# Patient Record
Sex: MALE | NOT HISPANIC OR LATINO | Employment: FULL TIME | URBAN - METROPOLITAN AREA
[De-identification: names, ages, dates, MRNs, and addresses within clinical notes are randomized per-mention and may not be internally consistent; named-entity substitution may affect disease eponyms.]

---

## 2017-09-01 ENCOUNTER — HOSPITAL ENCOUNTER (EMERGENCY)
Facility: HOSPITAL | Age: 32
Discharge: HOME | End: 2017-09-01
Attending: EMERGENCY MEDICINE | Admitting: EMERGENCY MEDICINE

## 2017-09-01 ENCOUNTER — APPOINTMENT (OUTPATIENT)
Dept: CT IMAGING | Facility: HOSPITAL | Age: 32
End: 2017-09-01

## 2017-09-01 VITALS
HEIGHT: 70 IN | HEART RATE: 80 BPM | OXYGEN SATURATION: 98 % | TEMPERATURE: 98.8 F | SYSTOLIC BLOOD PRESSURE: 130 MMHG | BODY MASS INDEX: 20.04 KG/M2 | RESPIRATION RATE: 18 BRPM | DIASTOLIC BLOOD PRESSURE: 75 MMHG | WEIGHT: 140 LBS

## 2017-09-01 DIAGNOSIS — R56.9 NEW ONSET SEIZURE (HCC): Primary | ICD-10-CM

## 2017-09-01 LAB
ALBUMIN SERPL-MCNC: 4.5 G/DL (ref 3.5–5.2)
ALBUMIN/GLOB SERPL: 1.5 G/DL
ALP SERPL-CCNC: 63 U/L (ref 39–117)
ALT SERPL W P-5'-P-CCNC: 11 U/L (ref 1–41)
ANION GAP SERPL CALCULATED.3IONS-SCNC: 11.7 MMOL/L
AST SERPL-CCNC: 14 U/L (ref 1–40)
BASOPHILS # BLD AUTO: 0.03 10*3/MM3 (ref 0–0.2)
BASOPHILS NFR BLD AUTO: 0.5 % (ref 0–1.5)
BILIRUB SERPL-MCNC: 0.8 MG/DL (ref 0.1–1.2)
BUN BLD-MCNC: 9 MG/DL (ref 6–20)
BUN/CREAT SERPL: 9.7 (ref 7–25)
CALCIUM SPEC-SCNC: 9.3 MG/DL (ref 8.6–10.5)
CHLORIDE SERPL-SCNC: 104 MMOL/L (ref 98–107)
CO2 SERPL-SCNC: 28.3 MMOL/L (ref 22–29)
CREAT BLD-MCNC: 0.93 MG/DL (ref 0.76–1.27)
DEPRECATED RDW RBC AUTO: 38.6 FL (ref 37–54)
EOSINOPHIL # BLD AUTO: 0.15 10*3/MM3 (ref 0–0.7)
EOSINOPHIL NFR BLD AUTO: 2.4 % (ref 0.3–6.2)
ERYTHROCYTE [DISTWIDTH] IN BLOOD BY AUTOMATED COUNT: 12.8 % (ref 11.5–14.5)
GFR SERPL CREATININE-BSD FRML MDRD: 113 ML/MIN/1.73
GFR SERPL CREATININE-BSD FRML MDRD: 94 ML/MIN/1.73
GLOBULIN UR ELPH-MCNC: 3.1 GM/DL
GLUCOSE BLD-MCNC: 101 MG/DL (ref 65–99)
HCT VFR BLD AUTO: 42.6 % (ref 40.4–52.2)
HGB BLD-MCNC: 14 G/DL (ref 13.7–17.6)
HOLD SPECIMEN: NORMAL
HOLD SPECIMEN: NORMAL
IMM GRANULOCYTES # BLD: 0 10*3/MM3 (ref 0–0.03)
IMM GRANULOCYTES NFR BLD: 0 % (ref 0–0.5)
LYMPHOCYTES # BLD AUTO: 2.63 10*3/MM3 (ref 0.9–4.8)
LYMPHOCYTES NFR BLD AUTO: 42.8 % (ref 19.6–45.3)
MAGNESIUM SERPL-MCNC: 2.5 MG/DL (ref 1.6–2.6)
MCH RBC QN AUTO: 27.7 PG (ref 27–32.7)
MCHC RBC AUTO-ENTMCNC: 32.9 G/DL (ref 32.6–36.4)
MCV RBC AUTO: 84.4 FL (ref 79.8–96.2)
MONOCYTES # BLD AUTO: 0.44 10*3/MM3 (ref 0.2–1.2)
MONOCYTES NFR BLD AUTO: 7.2 % (ref 5–12)
NEUTROPHILS # BLD AUTO: 2.89 10*3/MM3 (ref 1.9–8.1)
NEUTROPHILS NFR BLD AUTO: 47.1 % (ref 42.7–76)
PLATELET # BLD AUTO: 186 10*3/MM3 (ref 140–500)
PMV BLD AUTO: 10.1 FL (ref 6–12)
POTASSIUM BLD-SCNC: 3.5 MMOL/L (ref 3.5–5.2)
PROT SERPL-MCNC: 7.6 G/DL (ref 6–8.5)
RBC # BLD AUTO: 5.05 10*6/MM3 (ref 4.6–6)
SODIUM BLD-SCNC: 144 MMOL/L (ref 136–145)
WBC NRBC COR # BLD: 6.14 10*3/MM3 (ref 4.5–10.7)
WHOLE BLOOD HOLD SPECIMEN: NORMAL
WHOLE BLOOD HOLD SPECIMEN: NORMAL

## 2017-09-01 PROCEDURE — 96365 THER/PROPH/DIAG IV INF INIT: CPT

## 2017-09-01 PROCEDURE — 70450 CT HEAD/BRAIN W/O DYE: CPT

## 2017-09-01 PROCEDURE — 85025 COMPLETE CBC W/AUTO DIFF WBC: CPT | Performed by: EMERGENCY MEDICINE

## 2017-09-01 PROCEDURE — 99284 EMERGENCY DEPT VISIT MOD MDM: CPT

## 2017-09-01 PROCEDURE — 80053 COMPREHEN METABOLIC PANEL: CPT | Performed by: EMERGENCY MEDICINE

## 2017-09-01 PROCEDURE — 93005 ELECTROCARDIOGRAM TRACING: CPT | Performed by: EMERGENCY MEDICINE

## 2017-09-01 PROCEDURE — 93010 ELECTROCARDIOGRAM REPORT: CPT | Performed by: INTERNAL MEDICINE

## 2017-09-01 PROCEDURE — 25010000003 LEVETIRACETAM IN NACL 0.75% 1000 MG/100ML SOLUTION: Performed by: EMERGENCY MEDICINE

## 2017-09-01 PROCEDURE — 83735 ASSAY OF MAGNESIUM: CPT | Performed by: EMERGENCY MEDICINE

## 2017-09-01 RX ORDER — LEVETIRACETAM 10 MG/ML
1000 INJECTION INTRAVASCULAR ONCE
Status: COMPLETED | OUTPATIENT
Start: 2017-09-01 | End: 2017-09-01

## 2017-09-01 RX ORDER — LEVETIRACETAM 500 MG/1
500 TABLET ORAL 2 TIMES DAILY
Qty: 60 TABLET | Refills: 0 | Status: SHIPPED | OUTPATIENT
Start: 2017-09-01

## 2017-09-01 RX ORDER — SODIUM CHLORIDE 0.9 % (FLUSH) 0.9 %
10 SYRINGE (ML) INJECTION AS NEEDED
Status: DISCONTINUED | OUTPATIENT
Start: 2017-09-01 | End: 2017-09-01 | Stop reason: HOSPADM

## 2017-09-01 RX ADMIN — LEVETIRACETAM 1000 MG: 1000 INJECTION, SOLUTION INTRAVENOUS at 02:05

## 2017-09-01 NOTE — ED NOTES
Pt's friends at bedside request pt call when determined if staying or being discharged  Edi - [phone]     Delfina Weaver RN  09/01/17 0049

## 2017-09-01 NOTE — ED PROVIDER NOTES
" EMERGENCY DEPARTMENT ENCOUNTER    CHIEF COMPLAINT  Chief Complaint: seizure  History given by: alicia jordan  History limited by: nothing  Room Number: 26/26  PMD: No Known Provider      HPI:  Pt is a 31 y.o. male who presents complaining of a seizure PTA. Per neighbors, the pt was acting normally, then went unconscious for 15 minutes. He woke up in the ambulance. He admits to right shoulder pain but denies headache, vision symptoms, or CP. Pt did bite tongue but did not have incontinence.    Duration:  PTA  Onset: sudden  Timing: episode  Location: N/A  Radiation: none  Quality: \"possible seizure\"  Intensity/Severity: moderate  Progression: resolved  Associated Symptoms: right shoulder pain, bitten tongue  Aggravating Factors: unknown  Alleviating Factors: unknown  Previous Episodes: none  Treatment before arrival: unknown    PAST MEDICAL HISTORY  Active Ambulatory Problems     Diagnosis Date Noted   • No Active Ambulatory Problems     Resolved Ambulatory Problems     Diagnosis Date Noted   • No Resolved Ambulatory Problems     No Additional Past Medical History       PAST SURGICAL HISTORY  No past surgical history on file.    FAMILY HISTORY  No family history on file.    SOCIAL HISTORY  Social History     Social History   • Marital status:      Spouse name: N/A   • Number of children: N/A   • Years of education: N/A     Occupational History   • Not on file.     Social History Main Topics   • Smoking status: Not on file   • Smokeless tobacco: Not on file   • Alcohol use Not on file   • Drug use: Not on file   • Sexual activity: Not on file     Other Topics Concern   • Not on file     Social History Narrative       ALLERGIES  Review of patient's allergies indicates no known allergies.    REVIEW OF SYSTEMS  Review of Systems   Constitutional: Negative for activity change, appetite change and fever.        Bitten tongue   HENT: Negative for congestion and sore throat.    Eyes: Negative.    Respiratory: " Negative for cough and shortness of breath.    Cardiovascular: Negative for chest pain and leg swelling.   Gastrointestinal: Negative for abdominal pain, diarrhea and vomiting.   Endocrine: Negative.    Genitourinary: Negative for decreased urine volume and dysuria.   Musculoskeletal: Negative for neck pain.        Right shoulder pain   Skin: Negative for rash and wound.   Allergic/Immunologic: Negative.    Neurological: Positive for seizures. Negative for weakness, numbness and headaches.   Hematological: Negative.    Psychiatric/Behavioral: Negative.    All other systems reviewed and are negative.      PHYSICAL EXAM  ED Triage Vitals   Temp Heart Rate Resp BP SpO2   09/01/17 0022 09/01/17 0020 09/01/17 0020 09/01/17 0020 09/01/17 0020   98.8 °F (37.1 °C) 144 16 146/91 97 %      Temp src Heart Rate Source Patient Position BP Location FiO2 (%)   -- -- -- -- --              Physical Exam   Constitutional: He is oriented to person, place, and time and well-developed, well-nourished, and in no distress.   HENT:   Head: Normocephalic and atraumatic.   Bite on right lateral aspect of the tongue   Eyes: EOM are normal. Pupils are equal, round, and reactive to light.   Neck: Normal range of motion. Neck supple.   Cardiovascular: Normal rate, regular rhythm and normal heart sounds.    Pulmonary/Chest: Effort normal and breath sounds normal. No respiratory distress.   Abdominal: Soft. There is no tenderness. There is no rebound and no guarding.   Musculoskeletal: Normal range of motion. He exhibits no edema.   FROM of right shoulder without pain or deformity.  NVM intact distally.      Neurological: He is alert and oriented to person, place, and time. He has normal sensation and normal strength. No cranial nerve deficit. Gait normal. Coordination normal. GCS score is 15.   Skin: Skin is warm and dry.   Psychiatric: Mood and affect normal.   Nursing note and vitals reviewed.      LAB RESULTS  Lab Results (last 24 hours)      Procedure Component Value Units Date/Time    CBC & Differential [[id number]] Collected:  09/01/17 0044    Specimen:  Blood Updated:  09/01/17 0100    Narrative:       The following orders were created for panel order CBC & Differential.  Procedure                               Abnormality         Status                     ---------                               -----------         ------                     CBC Auto Differential[[id number]]        Normal              Final result                 Please view results for these tests on the individual orders.    Comprehensive Metabolic Panel [[id number]]  (Abnormal) Collected:  09/01/17 0044    Specimen:  Blood Updated:  09/01/17 0124     Glucose 101 (H) mg/dL      BUN 9 mg/dL      Creatinine 0.93 mg/dL      Sodium 144 mmol/L      Potassium 3.5 mmol/L      Chloride 104 mmol/L      CO2 28.3 mmol/L      Calcium 9.3 mg/dL      Total Protein 7.6 g/dL      Albumin 4.50 g/dL      ALT (SGPT) 11 U/L      AST (SGOT) 14 U/L      Alkaline Phosphatase 63 U/L      Total Bilirubin 0.8 mg/dL      eGFR Non African Amer 94 mL/min/1.73      eGFR  African Amer 113 mL/min/1.73      Globulin 3.1 gm/dL      A/G Ratio 1.5 g/dL      BUN/Creatinine Ratio 9.7     Anion Gap 11.7 mmol/L     Magnesium [[id number]]  (Normal) Collected:  09/01/17 0044    Specimen:  Blood Updated:  09/01/17 0124     Magnesium 2.5 mg/dL     CBC Auto Differential [[id number]]  (Normal) Collected:  09/01/17 0044    Specimen:  Blood Updated:  09/01/17 0100     WBC 6.14 10*3/mm3      RBC 5.05 10*6/mm3      Hemoglobin 14.0 g/dL      Hematocrit 42.6 %      MCV 84.4 fL      MCH 27.7 pg      MCHC 32.9 g/dL      RDW 12.8 %      RDW-SD 38.6 fl      MPV 10.1 fL      Platelets 186 10*3/mm3      Neutrophil % 47.1 %      Lymphocyte % 42.8 %      Monocyte % 7.2 %      Eosinophil % 2.4 %      Basophil % 0.5 %      Immature Grans % 0.0 %      Neutrophils, Absolute 2.89 10*3/mm3      Lymphocytes, Absolute 2.63 10*3/mm3      Monocytes,  Absolute 0.44 10*3/mm3      Eosinophils, Absolute 0.15 10*3/mm3      Basophils, Absolute 0.03 10*3/mm3      Immature Grans, Absolute 0.00 10*3/mm3           I ordered the above labs and reviewed the results    RADIOLOGY  CT Head Without Contrast   Final Result   1. No acute intracranial abnormality.                           This study was performed with techniques to keep radiation doses as low   as reasonably achievable (ALARA). Individualized dose reduction   techniques using automated exposure control or adjustment of mA and/or   kV according to the patient size were employed.        This report was finalized on 9/1/2017 1:03 AM by Brennon Abarca MD.               I ordered the above noted radiological studies. Interpreted by radiologist. Reviewed by me in PACS.       PROCEDURES  Procedures  EKG           EKG time: 0120  Rhythm/Rate: NSR 80  P waves and IN: normal  QRS, axis: normal   ST and T waves: normal     Interpreted Contemporaneously by me, independently viewed  No priors for comparison      PROGRESS AND CONSULTS  ED Course     0032 - Ordered labs and CT Head for further evaluation.    0111 - Ordered EKG for further evaluation.    0133 - Pt rechecked. Pt is resting comfortably. Notified pt of unremarkable labs and negative imaging. Notified pt of state law disallowing driving for 90 days. Recommended admission for further testing, pt requests to be discharged. Discussed plan to discharge the pt on seizure medication and for pt to follow up with a Neurologist. Pt understands and agrees with plan, all questions addressed.    0137 - Ordered Keppra for seizure.    MEDICAL DECISION MAKING  Results were reviewed/discussed with the patient and they were also made aware of online access. Pt also made aware that some labs, such as cultures, will not be resulted during ER visit and follow up with PMD is necessary.     MDM  Number of Diagnoses or Management Options     Amount and/or Complexity of Data  Reviewed  Clinical lab tests: reviewed and ordered (unremarkable)  Tests in the radiology section of CPT®: reviewed and ordered (CT Head- nothing acute)  Tests in the medicine section of CPT®: reviewed and ordered (See EKG procedure note)           DIAGNOSIS  Final diagnoses:   New onset seizure       DISPOSITION  DISCHARGE    Patient discharged in stable condition.    Reviewed implications of results, diagnosis, meds, responsibility to follow up, warning signs and symptoms of possible worsening, potential complications and reasons to return to ER.    Patient/Family voiced understanding of above instructions.    Discussed plan for discharge, as there is no emergent indication for admission.  Pt/family is agreeable and understands need for follow up and repeat testing.  Pt is aware that discharge does not mean that nothing is wrong but it indicates no emergency is present that requires admission and they must continue care with follow-up as given below or physician of their choice.     FOLLOW-UP  [facility]YS[facility] REFERRAL SERVICE  [facility] [id number] [phone]  Schedule an appointment as soon as possible for a visit  for continued medical care    [facility]  [address] [facility] [address]  [facility] [address] [phone]  Schedule an appointment as soon as possible for a visit  new onset seizure    Dajuan Alt, DO  [address]  [facility] [address]  [phone]    Schedule an appointment as soon as possible for a visit  seizure         Medication List      New Prescriptions          levETIRAcetam 500 MG tablet   Commonly known as:  KEPPRA   Take 1 tablet by mouth 2 (Two) Times a Day.               Latest Documented Vital Signs:  As of 1:57 AM  BP- (!) 121/105 HR- 96 Temp- 98.8 °F (37.1 °C) O2 sat- 100%    --  Documentation assistance provided by erica Girard for Dr. Mcclain.  Information recorded by the scribcheli was done at my direction and  has been verified and validated by me.     Davey Girard  09/01/17 0157       Noe Mcclain MD  09/01/17 0201

## 2017-11-16 ENCOUNTER — HOSPITAL ENCOUNTER (EMERGENCY)
Facility: HOSPITAL | Age: 32
Discharge: HOME | End: 2017-11-16
Attending: EMERGENCY MEDICINE | Admitting: EMERGENCY MEDICINE

## 2017-11-16 VITALS
OXYGEN SATURATION: 99 % | TEMPERATURE: 98.7 F | WEIGHT: 143 LBS | DIASTOLIC BLOOD PRESSURE: 86 MMHG | HEART RATE: 90 BPM | SYSTOLIC BLOOD PRESSURE: 121 MMHG | BODY MASS INDEX: 20.47 KG/M2 | RESPIRATION RATE: 16 BRPM | HEIGHT: 70 IN

## 2017-11-16 DIAGNOSIS — G40.919 BREAKTHROUGH SEIZURE (HCC): Primary | ICD-10-CM

## 2017-11-16 LAB
ALBUMIN SERPL-MCNC: 5.1 G/DL (ref 3.5–5.2)
ALBUMIN/GLOB SERPL: 1.5 G/DL
ALP SERPL-CCNC: 73 U/L (ref 39–117)
ALT SERPL W P-5'-P-CCNC: 17 U/L (ref 1–41)
ANION GAP SERPL CALCULATED.3IONS-SCNC: 13.6 MMOL/L
AST SERPL-CCNC: 23 U/L (ref 1–40)
BILIRUB SERPL-MCNC: 0.6 MG/DL (ref 0.1–1.2)
BUN BLD-MCNC: 9 MG/DL (ref 6–20)
BUN/CREAT SERPL: 8.7 (ref 7–25)
CALCIUM SPEC-SCNC: 10.1 MG/DL (ref 8.6–10.5)
CHLORIDE SERPL-SCNC: 102 MMOL/L (ref 98–107)
CO2 SERPL-SCNC: 25.4 MMOL/L (ref 22–29)
CREAT BLD-MCNC: 1.03 MG/DL (ref 0.76–1.27)
GFR SERPL CREATININE-BSD FRML MDRD: 100 ML/MIN/1.73
GFR SERPL CREATININE-BSD FRML MDRD: 83 ML/MIN/1.73
GLOBULIN UR ELPH-MCNC: 3.4 GM/DL
GLUCOSE BLD-MCNC: 85 MG/DL (ref 65–99)
HOLD SPECIMEN: NORMAL
HOLD SPECIMEN: NORMAL
POTASSIUM BLD-SCNC: 4.2 MMOL/L (ref 3.5–5.2)
PROT SERPL-MCNC: 8.5 G/DL (ref 6–8.5)
SODIUM BLD-SCNC: 141 MMOL/L (ref 136–145)
WHOLE BLOOD HOLD SPECIMEN: NORMAL
WHOLE BLOOD HOLD SPECIMEN: NORMAL

## 2017-11-16 PROCEDURE — 80053 COMPREHEN METABOLIC PANEL: CPT | Performed by: EMERGENCY MEDICINE

## 2017-11-16 PROCEDURE — 36415 COLL VENOUS BLD VENIPUNCTURE: CPT

## 2017-11-16 PROCEDURE — 25010000002 LEVETIRACETAM IN NACL 0.82% 500 MG/100ML SOLUTION: Performed by: EMERGENCY MEDICINE

## 2017-11-16 PROCEDURE — 96365 THER/PROPH/DIAG IV INF INIT: CPT

## 2017-11-16 PROCEDURE — 99284 EMERGENCY DEPT VISIT MOD MDM: CPT

## 2017-11-16 RX ORDER — LEVETIRACETAM 5 MG/ML
500 INJECTION INTRAVASCULAR ONCE
Status: COMPLETED | OUTPATIENT
Start: 2017-11-16 | End: 2017-11-16

## 2017-11-16 RX ORDER — SODIUM CHLORIDE 0.9 % (FLUSH) 0.9 %
10 SYRINGE (ML) INJECTION AS NEEDED
Status: DISCONTINUED | OUTPATIENT
Start: 2017-11-16 | End: 2017-11-17 | Stop reason: HOSPADM

## 2017-11-16 RX ORDER — LEVETIRACETAM 500 MG/1
1000 TABLET ORAL 2 TIMES DAILY
COMMUNITY

## 2017-11-16 RX ADMIN — LEVETIRACETAM 500 MG: 500 INJECTION, SOLUTION INTRAVENOUS at 20:58

## 2017-11-17 NOTE — DISCHARGE INSTRUCTIONS
Continue home Keppra and follow up with Dr. Blakely.  Please return to the ED if symptoms worsen or if you have another seizure.  You cannot drive for 3 months unless told otherwise by Dr. Blakely.

## 2017-11-17 NOTE — ED PROVIDER NOTES
EMERGENCY DEPARTMENT ENCOUNTER    CHIEF COMPLAINT  Chief Complaint: seizure  History given by: patient  History limited by: nothing  Room Number: 13/13  PMD: No Known Provider  Neurologist- Dr. Blakely    HPI:  Pt is a 32 y.o. male with a history of seizures, who presents complaining of a seizure PTA. Pt states that he was doing pushups when he began to seize. Pt states that his spouse said that he was unconscious for several months. Pt states that he bit his tongue and complains of R shoulder pain and left foot pain. Pt denies urinary or fecal incontinence, any recent illness or not sleeping well last night. Pt is currently taking 1000mg Keppra BID and denies missing any recent doses of his Keppra.    Duration:  Several minutes  Onset: gradual  Timing: constant  Location: generalized  Radiation: N/A  Quality: unspecified  Intensity/Severity: moderate to severe  Progression: resolved  Associated Symptoms: injury to tongue, R shoulder, L foot pain  Aggravating Factors: none  Alleviating Factors: none  Previous Episodes: Pt states that he has a history of seizures.  Treatment before arrival: none    PAST MEDICAL HISTORY  Active Ambulatory Problems     Diagnosis Date Noted   • No Active Ambulatory Problems     Resolved Ambulatory Problems     Diagnosis Date Noted   • No Resolved Ambulatory Problems     Past Medical History:   Diagnosis Date   • Seizures        PAST SURGICAL HISTORY  History reviewed. No pertinent surgical history.    FAMILY HISTORY  History reviewed. No pertinent family history.    SOCIAL HISTORY  Social History     Social History   • Marital status:      Spouse name: N/A   • Number of children: N/A   • Years of education: N/A     Occupational History   • Not on file.     Social History Main Topics   • Smoking status: Never Smoker   • Smokeless tobacco: Not on file   • Alcohol use No      Comment: no alcohol x 3 mos   • Drug use: No   • Sexual activity: Not on file     Other Topics Concern   • Not  on file     Social History Narrative   • No narrative on file       ALLERGIES  Review of patient's allergies indicates no known allergies.    REVIEW OF SYSTEMS  Review of Systems   Constitutional: Negative for activity change, appetite change and fever.   HENT: Negative for congestion and sore throat.    Eyes: Negative.    Respiratory: Negative for cough and shortness of breath.    Cardiovascular: Negative for chest pain and leg swelling.   Gastrointestinal: Negative for abdominal pain, diarrhea and vomiting.   Endocrine: Negative.    Genitourinary: Negative for decreased urine volume and dysuria.   Musculoskeletal: Positive for arthralgias (R shoulder, L foot). Negative for neck pain.   Skin: Positive for wound (bit tongue). Negative for rash.   Allergic/Immunologic: Negative.    Neurological: Positive for seizures. Negative for weakness, numbness and headaches.   Hematological: Negative.    Psychiatric/Behavioral: Negative.    All other systems reviewed and are negative.      PHYSICAL EXAM  ED Triage Vitals   Temp Heart Rate Resp BP SpO2   11/16/17 2021 11/16/17 2021 11/16/17 2021 11/16/17 2021 11/16/17 2021   98.7 °F (37.1 °C) 110 12 130/73 98 %      Temp src Heart Rate Source Patient Position BP Location FiO2 (%)   11/16/17 2021 -- -- -- --   Tympanic           Physical Exam   Constitutional: He is oriented to person, place, and time. He appears distressed (mild).   HENT:   Head: Normocephalic. Head is with abrasion (on lateral right tongue).   Eyes: EOM are normal. Pupils are equal, round, and reactive to light.   Neck: Normal range of motion. Neck supple.   Cardiovascular: Normal rate, regular rhythm and normal heart sounds.    No murmur heard.  Pulmonary/Chest: Effort normal and breath sounds normal. No respiratory distress.   Abdominal: Soft. Bowel sounds are normal. He exhibits no distension. There is no tenderness. There is no rebound and no guarding.   Musculoskeletal: Normal range of motion. He exhibits  no edema.        Right shoulder: He exhibits normal range of motion and no tenderness.        Cervical back: He exhibits no tenderness.        Left foot: There is tenderness (minimal tenderness along the arch).   Lymphadenopathy:     He has no cervical adenopathy.   Neurological: He is alert and oriented to person, place, and time. He has normal sensation and normal strength.   Skin: Skin is warm and dry.   Psychiatric: Mood and affect normal.   Nursing note and vitals reviewed.      LAB RESULTS  Lab Results (last 24 hours)     Procedure Component Value Units Date/Time    Comprehensive Metabolic Panel [[id number]] Collected:  11/16/17 2036    Specimen:  Blood Updated:  11/16/17 2156     Glucose 85 mg/dL      BUN 9 mg/dL      Creatinine 1.03 mg/dL      Sodium 141 mmol/L      Potassium 4.2 mmol/L      Chloride 102 mmol/L      CO2 25.4 mmol/L      Calcium 10.1 mg/dL      Total Protein 8.5 g/dL      Albumin 5.10 g/dL      ALT (SGPT) 17 U/L      AST (SGOT) 23 U/L      Alkaline Phosphatase 73 U/L      Total Bilirubin 0.6 mg/dL      eGFR Non African Amer 83 mL/min/1.73      eGFR  African Amer 100 mL/min/1.73      Globulin 3.4 gm/dL      A/G Ratio 1.5 g/dL      BUN/Creatinine Ratio 8.7     Anion Gap 13.6 mmol/L           I ordered the above labs and reviewed the results    PROCEDURES  Procedures      PROGRESS AND CONSULTS  ED Course     2050- Pt declined L foot XR. Discussed the plan to order Keppra and to observe the pt for any additional seizure activity. Pt agrees with the plan and all questions were addressed.    2051- Ordered CMP for further evaluation and Keppra for seizure prevention.    2229- Pt has not had any seizure activity in the ED. Pt is eager to be discharged home. I will discharge the pt home.     10:30 PM  Latest vital signs   BP- 114/81 HR- 91 Temp- 98.7 °F (37.1 °C) (Tympanic) O2 sat- 100%    2233- Rechecked pt. Pt is resting comfortably and has had no seizure activity in the ED. Discussed the plan to  discharge the pt home with instructions to follow up with Dr. Blakely (neurology). I gave the pt seizure precautions and instructed him not to drive. Pt agrees with the plan and all questions were addressed.    MEDICAL DECISION MAKING  Results were reviewed/discussed with the patient and they were also made aware of online access. Pt also made aware that some labs, such as cultures, will not be resulted during ER visit and follow up with PMD is necessary.     MDM  Number of Diagnoses or Management Options     Amount and/or Complexity of Data Reviewed  Clinical lab tests: ordered and reviewed (CMP is unremarakble)  Decide to obtain previous medical records or to obtain history from someone other than the patient: yes  Review and summarize past medical records: yes (Pt was last seen in the ED on 9-1-17 for new onset seizure. Pt was started on Keppra and referred to neurology.)    Patient Progress  Patient progress: stable         DIAGNOSIS  Final diagnoses:   Breakthrough seizure       DISPOSITION  DISCHARGE    Patient discharged in stable condition.    Reviewed implications of results, diagnosis, meds, responsibility to follow up, warning signs and symptoms of possible worsening, potential complications and reasons to return to ER, including fever, worsening pain or any concerns.    Patient/Family voiced understanding of above instructions.    Discussed plan for discharge, as there is no emergent indication for admission.  Pt/family is agreeable and understands need for follow up and repeat testing.  Pt is aware that discharge does not mean that nothing is wrong but it indicates no emergency is present that requires admission and they must continue care with follow-up as given below or physician of their choice.     FOLLOW-UP  Jim Blakely MD  [address]  [facility] [address]  [facility] [address]  [phone]    Schedule an appointment as soon as possible for a visit           Medication List      Changed          levETIRAcetam  500 MG tablet   Commonly known as:  KEPPRA   What changed:  Another medication with the same name was removed. Continue   taking this medication, and follow the directions you see here.               Latest Documented Vital Signs:  As of 10:59 PM  BP- 116/88 HR- 91 Temp- 98.7 °F (37.1 °C) (Tympanic) O2 sat- 99%    --  Documentation assistance provided by erica Barrientos for Dr. Mayberry.  Information recorded by the ellaibcheli was done at my direction and has been verified and validated by me.     Reina Barrientos  11/16/17 0217       Isidro Mayberry MD  11/16/17 0549

## 2017-11-17 NOTE — ED NOTES
Pt to room 13 via ems stretcher with c/o of seizure that lasted approx 1 minute, prior to arrival.  Pt is alert and oriented at this time, did not lose control of bladder, but did bite his tongue.  Seizure was witnessed by his wife.  Pt was doing push ups at the time of his seizure, and he felt it coming on. Pt c/o slight pain to right shoulder when he turns his head to the right, but no other c/o at this time. Pt is PERRLA, respirations are even and unlabored, chest rise and fall is equal in expansion.  Pt does not appear to be in distress at this time.  Bed in low position, call light within reach, side rails up X2 and padded.  Suction in place at bedside.     Judy Bradley RN  11/16/17 2040

## 2017-11-17 NOTE — ED TRIAGE NOTES
Seizure -- clonic type seizure.  HX of same.  Last seizure was 1 month ago.  Saw neuologist and had meds increased. First seizure since increase in meds.  Pt awake and alert at this time.  Pt was working out at time of seizure.

## 2019-01-04 ENCOUNTER — HOSPITAL ENCOUNTER (EMERGENCY)
Facility: HOSPITAL | Age: 34
Discharge: HOME | End: 2019-01-04
Attending: EMERGENCY MEDICINE | Admitting: EMERGENCY MEDICINE

## 2019-01-04 VITALS
OXYGEN SATURATION: 100 % | RESPIRATION RATE: 18 BRPM | BODY MASS INDEX: 22.1 KG/M2 | HEIGHT: 72 IN | TEMPERATURE: 98.5 F | WEIGHT: 163.14 LBS | HEART RATE: 79 BPM | DIASTOLIC BLOOD PRESSURE: 87 MMHG | SYSTOLIC BLOOD PRESSURE: 130 MMHG

## 2019-01-04 DIAGNOSIS — R56.9 SEIZURE (HCC): Primary | ICD-10-CM

## 2019-01-04 LAB
ANION GAP SERPL CALCULATED.3IONS-SCNC: 8.9 MMOL/L
BASOPHILS # BLD AUTO: 0.03 10*3/MM3 (ref 0–0.2)
BASOPHILS NFR BLD AUTO: 0.5 % (ref 0–1.5)
BUN BLD-MCNC: 10 MG/DL (ref 6–20)
BUN/CREAT SERPL: 13.2 (ref 7–25)
CALCIUM SPEC-SCNC: 8.8 MG/DL (ref 8.6–10.5)
CHLORIDE SERPL-SCNC: 97 MMOL/L (ref 98–107)
CO2 SERPL-SCNC: 26.1 MMOL/L (ref 22–29)
CREAT BLD-MCNC: 0.76 MG/DL (ref 0.76–1.27)
DEPRECATED RDW RBC AUTO: 35.9 FL (ref 37–54)
EOSINOPHIL # BLD AUTO: 0.06 10*3/MM3 (ref 0–0.7)
EOSINOPHIL NFR BLD AUTO: 1 % (ref 0.3–6.2)
ERYTHROCYTE [DISTWIDTH] IN BLOOD BY AUTOMATED COUNT: 12.5 % (ref 11.5–14.5)
GFR SERPL CREATININE-BSD FRML MDRD: 117 ML/MIN/1.73
GFR SERPL CREATININE-BSD FRML MDRD: 141 ML/MIN/1.73
GLUCOSE BLD-MCNC: 101 MG/DL (ref 65–99)
HCT VFR BLD AUTO: 39.9 % (ref 40.4–52.2)
HGB BLD-MCNC: 13.8 G/DL (ref 13.7–17.6)
IMM GRANULOCYTES # BLD AUTO: 0.01 10*3/MM3 (ref 0–0.03)
IMM GRANULOCYTES NFR BLD AUTO: 0.2 % (ref 0–0.5)
LYMPHOCYTES # BLD AUTO: 1.52 10*3/MM3 (ref 0.9–4.8)
LYMPHOCYTES NFR BLD AUTO: 25.6 % (ref 19.6–45.3)
MCH RBC QN AUTO: 27.1 PG (ref 27–32.7)
MCHC RBC AUTO-ENTMCNC: 34.6 G/DL (ref 32.6–36.4)
MCV RBC AUTO: 78.2 FL (ref 79.8–96.2)
MONOCYTES # BLD AUTO: 0.48 10*3/MM3 (ref 0.2–1.2)
MONOCYTES NFR BLD AUTO: 8.1 % (ref 5–12)
NEUTROPHILS # BLD AUTO: 3.85 10*3/MM3 (ref 1.9–8.1)
NEUTROPHILS NFR BLD AUTO: 64.8 % (ref 42.7–76)
PLATELET # BLD AUTO: 204 10*3/MM3 (ref 140–500)
PMV BLD AUTO: 9.6 FL (ref 6–12)
POTASSIUM BLD-SCNC: 3.7 MMOL/L (ref 3.5–5.2)
RBC # BLD AUTO: 5.1 10*6/MM3 (ref 4.6–6)
SODIUM BLD-SCNC: 132 MMOL/L (ref 136–145)
WBC NRBC COR # BLD: 5.94 10*3/MM3 (ref 4.5–10.7)

## 2019-01-04 PROCEDURE — 80048 BASIC METABOLIC PNL TOTAL CA: CPT | Performed by: EMERGENCY MEDICINE

## 2019-01-04 PROCEDURE — 85025 COMPLETE CBC W/AUTO DIFF WBC: CPT | Performed by: EMERGENCY MEDICINE

## 2019-01-04 PROCEDURE — 99284 EMERGENCY DEPT VISIT MOD MDM: CPT

## 2019-01-04 RX ORDER — SODIUM CHLORIDE 0.9 % (FLUSH) 0.9 %
10 SYRINGE (ML) INJECTION AS NEEDED
Status: DISCONTINUED | OUTPATIENT
Start: 2019-01-04 | End: 2019-01-04 | Stop reason: HOSPADM

## 2019-01-04 NOTE — ED NOTES
Pt reports he was sitting at his desk when he had what coworkers called a seizure. Pt reports he did not fall out of his chair but was laying on his right shoulder on the desk. Pt has a small bite kristin to the right side of his tongue. Pt is alert and oriented x3 at this time and has no complaints.      Joyce Vasquez, KAVEH  01/04/19 2268

## 2019-01-04 NOTE — ED PROVIDER NOTES
EMERGENCY DEPARTMENT ENCOUNTER    Room Number:  23/23  Date seen:  1/4/2019  Time seen: 5:15 PM  PCP: Provider, No Known  Historian: patient      HPI:  Chief Complaint: seizure  Context: Jerson Yañez is a 33 y.o. male who presents to the ED c/o witnessed seizure by family that occurred around [id number] and lasted 3 minutes. Pt states that pt was sitting down at the computer when seizure started. Pt has hx of seizures, on Keppra and followed by a neurologist but they do not know the name. Family and pt states he has been 1500 mg of Keppra twice a day. Pt states he has had 3 seizures, including this one, since starting Keppra medication a year and a half ago. Pt denies any HA, head injury, neck pain, back pain, and any other sx at this time.     Pain Location: diffuse  Quality: witnessed  Intensity/Severity: moderate  Duration: 2 hours  Onset quality: gradual  Progression: unchanged  Previous Episodes: Pt has hx of seizures.   Treatment before arrival: Pt is on Keppra.   Associated Symptoms: none    PAST MEDICAL HISTORY  Active Ambulatory Problems     Diagnosis Date Noted   • No Active Ambulatory Problems     Resolved Ambulatory Problems     Diagnosis Date Noted   • No Resolved Ambulatory Problems     Past Medical History:   Diagnosis Date   • Seizures (CMS/HCC)          PAST SURGICAL HISTORY  No past surgical history on file.      FAMILY HISTORY  No family history on file.      SOCIAL HISTORY  Social History     Socioeconomic History   • Marital status:      Spouse name: Not on file   • Number of children: Not on file   • Years of education: Not on file   • Highest education level: Not on file   Social Needs   • Financial resource strain: Not on file   • Food insecurity - worry: Not on file   • Food insecurity - inability: Not on file   • Transportation needs - medical: Not on file   • Transportation needs - non-medical: Not on file   Occupational History   • Not on file   Tobacco Use   • Smoking status:  Never Smoker   Substance and Sexual Activity   • Alcohol use: No     Comment: no alcohol x 3 mos   • Drug use: No   • Sexual activity: Not on file   Other Topics Concern   • Not on file   Social History Narrative   • Not on file         ALLERGIES  Patient has no known allergies.        REVIEW OF SYSTEMS  Review of Systems   Constitutional: Negative for diaphoresis and fever.   HENT: Negative for congestion.    Eyes: Negative for visual disturbance.   Respiratory: Negative for shortness of breath.    Cardiovascular: Negative for palpitations.   Gastrointestinal: Negative for blood in stool and vomiting.   Endocrine: Negative for polyuria.   Genitourinary: Negative for flank pain.   Musculoskeletal: Negative for back pain, joint swelling and neck pain.   Skin: Negative for wound.   Neurological: Positive for seizures.   Hematological: Negative for adenopathy.   Psychiatric/Behavioral: Negative for sleep disturbance.            PHYSICAL EXAM  ED Triage Vitals [01/04/19 1702]   Temp Heart Rate Resp BP SpO2   -- 110 -- 150/90 99 %      Temp src Heart Rate Source Patient Position BP Location FiO2 (%)   -- -- -- -- --         GENERAL:no acute distress  HENT: nares patent  EYES: no scleral icterus  CV: regular rhythm, normal rate, no murmur  RESPIRATORY: normal effort, CTAB  ABDOMEN: soft  MUSCULOSKELETAL: no deformity  NEURO: alert, moves all extremities, follows commands  SKIN: warm, dry    Vital signs and nursing notes reviewed.          LAB RESULTS  Recent Results (from the past 24 hour(s))   Basic Metabolic Panel    Collection Time: 01/04/19  5:53 PM   Result Value Ref Range    Glucose 101 (H) 65 - 99 mg/dL    BUN 10 6 - 20 mg/dL    Creatinine 0.76 0.76 - 1.27 mg/dL    Sodium 132 (L) 136 - 145 mmol/L    Potassium 3.7 3.5 - 5.2 mmol/L    Chloride 97 (L) 98 - 107 mmol/L    CO2 26.1 22.0 - 29.0 mmol/L    Calcium 8.8 8.6 - 10.5 mg/dL    eGFR  African Amer 141 >60 mL/min/1.73    eGFR Non African Amer 117 >60 mL/min/1.73     BUN/Creatinine Ratio 13.2 7.0 - 25.0    Anion Gap 8.9 mmol/L   CBC Auto Differential    Collection Time: 01/04/19  5:53 PM   Result Value Ref Range    WBC 5.94 4.50 - 10.70 10*3/mm3    RBC 5.10 4.60 - 6.00 10*6/mm3    Hemoglobin 13.8 13.7 - 17.6 g/dL    Hematocrit 39.9 (L) 40.4 - 52.2 %    MCV 78.2 (L) 79.8 - 96.2 fL    MCH 27.1 27.0 - 32.7 pg    MCHC 34.6 32.6 - 36.4 g/dL    RDW 12.5 11.5 - 14.5 %    RDW-SD 35.9 (L) 37.0 - 54.0 fl    MPV 9.6 6.0 - 12.0 fL    Platelets 204 140 - 500 10*3/mm3    Neutrophil % 64.8 42.7 - 76.0 %    Lymphocyte % 25.6 19.6 - 45.3 %    Monocyte % 8.1 5.0 - 12.0 %    Eosinophil % 1.0 0.3 - 6.2 %    Basophil % 0.5 0.0 - 1.5 %    Immature Grans % 0.2 0.0 - 0.5 %    Neutrophils, Absolute 3.85 1.90 - 8.10 10*3/mm3    Lymphocytes, Absolute 1.52 0.90 - 4.80 10*3/mm3    Monocytes, Absolute 0.48 0.20 - 1.20 10*3/mm3    Eosinophils, Absolute 0.06 0.00 - 0.70 10*3/mm3    Basophils, Absolute 0.03 0.00 - 0.20 10*3/mm3    Immature Grans, Absolute 0.01 0.00 - 0.03 10*3/mm3       Ordered the above labs and reviewed the results.        PROCEDURES  Procedures                MEDICATIONS GIVEN IN ER  Medications   sodium chloride 0.9 % flush 10 mL (not administered)                   PROGRESS AND CONSULTS     1736-Ordered lab work for further evaluation.     1852-Rechecked pt. Pt is resting comfortably. Pt is still asymptomatic. Notified pt and family of BMP and CBC which are negative acute. Discussed the plan to discharge the pt home. I instructed the pt to f/u with his neurologist for further evaluation and care. Gave pt RTER warning including multiple seizures within 24 hours. Pt and family understands and agrees with the plan, all questions answered.    MEDICAL DECISION MAKING    Seizure with hx of seizure disorder.  No clear trigger for this seizure today.  Labs reassuring.  Normal neuro exam.  Continue same dose of keppra, f/u with neurology, return precautions discussed.     MDM  Number of Diagnoses  or Management Options  Seizure (CMS/HCC):      Amount and/or Complexity of Data Reviewed  Clinical lab tests: reviewed and ordered (Hemoglobin-13.8  Glucose-101  sodium-132)  Decide to obtain previous medical records or to obtain history from someone other than the patient: yes  Obtain history from someone other than the patient: yes (family)  Review and summarize past medical records: yes (Pt has 2 prior ED visits for seizures. )               DIAGNOSIS  Final diagnoses:   Seizure (CMS/HCC)         DISPOSITION  DISCHARGE    Patient discharged in stable condition.    Reviewed implications of results, diagnosis, meds, responsibility to follow up, warning signs and symptoms of possible worsening, potential complications and reasons to return to ER.    Patient/Family voiced understanding of above instructions.    Discussed plan for discharge, as there is no emergent indication for admission. Patient referred to primary care provider for BP management due to today's BP. Pt/family is agreeable and understands need for follow up and repeat testing.  Pt is aware that discharge does not mean that nothing is wrong but it indicates no emergency is present that requires admission and they must continue care with follow-up as given below or physician of their choice.     FOLLOW-UP  Jim Blakely MD  [address] [address]  [phone]      As needed         Medication List      Changed    * levETIRAcetam 500 MG tablet  Commonly known as:  KEPPRA  What changed:  Another medication with the same name was changed. Make   sure you understand how and when to take each.     * levETIRAcetam 500 MG tablet  Commonly known as:  KEPPRA  Take 1 tablet by mouth 2 (Two) Times a Day.  What changed:  how much to take         * This list has 2 medication(s) that are the same as other medications   prescribed for you. Read the directions carefully, and ask your doctor or   other care provider to review them with you.                           Latest Documented Vital Signs:  As of 6:54 PM  BP- 122/88 HR- 88 Temp- 98.5 °F (36.9 °C) (Oral) O2 sat- 99%        --  Documentation assistance provided by erica Paniagua for Dr. JENNIFER Hooks MD.  Information recorded by the scribe was done at my direction and has been verified and validated by me.     Teena Paniagua  01/04/19 [id number]       Luis Hooks MD  01/04/19 1920

## 2019-01-04 NOTE — ED NOTES
I brought pt back to room 23 and began hooking him up to the monitor, but pt states he needs to use the restroom. Pt feels comfortable walking to the restroom at this time. Pt already used the restroom in triage and has a sample in the room. KAVEH Qiu aware of patient.      Ahsan Moise  01/04/19 [id number]

## 2019-01-04 NOTE — ED NOTES
Pt has 5 family members in the lobby requesting to see him, I allowed 2 back at this time and informed them we allow 2 back at a time.      Ahsan Moise  01/04/19 [id number]

## 2019-09-21 ENCOUNTER — HOSPITAL ENCOUNTER (EMERGENCY)
Facility: HOSPITAL | Age: 34
Discharge: LEFT BEFORE DISCHARGE | End: 2019-09-22

## 2019-09-22 VITALS
TEMPERATURE: 97.7 F | RESPIRATION RATE: 16 BRPM | OXYGEN SATURATION: 100 % | HEART RATE: 74 BPM | HEIGHT: 70 IN | DIASTOLIC BLOOD PRESSURE: 74 MMHG | BODY MASS INDEX: 23.41 KG/M2 | SYSTOLIC BLOOD PRESSURE: 124 MMHG

## 2021-04-16 ENCOUNTER — BULK ORDERING (OUTPATIENT)
Dept: CASE MANAGEMENT | Facility: OTHER | Age: 36
End: 2021-04-16

## 2021-04-16 DIAGNOSIS — Z23 IMMUNIZATION DUE: ICD-10-CM
